# Patient Record
Sex: FEMALE | Race: OTHER | NOT HISPANIC OR LATINO | URBAN - METROPOLITAN AREA
[De-identification: names, ages, dates, MRNs, and addresses within clinical notes are randomized per-mention and may not be internally consistent; named-entity substitution may affect disease eponyms.]

---

## 2017-01-14 ENCOUNTER — EMERGENCY (EMERGENCY)
Facility: HOSPITAL | Age: 47
LOS: 1 days | Discharge: HOME | End: 2017-01-14
Attending: EMERGENCY MEDICINE | Admitting: EMERGENCY MEDICINE
Payer: COMMERCIAL

## 2017-01-14 VITALS
OXYGEN SATURATION: 100 % | TEMPERATURE: 98 F | SYSTOLIC BLOOD PRESSURE: 156 MMHG | HEART RATE: 89 BPM | DIASTOLIC BLOOD PRESSURE: 96 MMHG | RESPIRATION RATE: 18 BRPM

## 2017-01-14 DIAGNOSIS — N83.201 UNSPECIFIED OVARIAN CYST, RIGHT SIDE: Chronic | ICD-10-CM

## 2017-01-14 LAB
ALBUMIN SERPL ELPH-MCNC: 4.3 G/DL — SIGNIFICANT CHANGE UP (ref 3.3–5)
ALP SERPL-CCNC: 58 U/L — SIGNIFICANT CHANGE UP (ref 40–120)
ALT FLD-CCNC: 21 U/L — SIGNIFICANT CHANGE UP (ref 4–33)
APPEARANCE UR: SIGNIFICANT CHANGE UP
AST SERPL-CCNC: 23 U/L — SIGNIFICANT CHANGE UP (ref 4–32)
BASOPHILS # BLD AUTO: 0.03 K/UL — SIGNIFICANT CHANGE UP (ref 0–0.2)
BASOPHILS NFR BLD AUTO: 0.3 % — SIGNIFICANT CHANGE UP (ref 0–2)
BILIRUB SERPL-MCNC: 0.3 MG/DL — SIGNIFICANT CHANGE UP (ref 0.2–1.2)
BILIRUB UR-MCNC: NEGATIVE — SIGNIFICANT CHANGE UP
BLOOD UR QL VISUAL: NEGATIVE — SIGNIFICANT CHANGE UP
BUN SERPL-MCNC: 13 MG/DL — SIGNIFICANT CHANGE UP (ref 7–23)
CALCIUM SERPL-MCNC: 9.9 MG/DL — SIGNIFICANT CHANGE UP (ref 8.4–10.5)
CHLORIDE SERPL-SCNC: 103 MMOL/L — SIGNIFICANT CHANGE UP (ref 98–107)
CK MB BLD-MCNC: 1.81 NG/ML — SIGNIFICANT CHANGE UP (ref 1–4.7)
CK MB BLD-MCNC: SIGNIFICANT CHANGE UP (ref 0–2.5)
CK SERPL-CCNC: 123 U/L — SIGNIFICANT CHANGE UP (ref 25–170)
CK SERPL-CCNC: 139 U/L — SIGNIFICANT CHANGE UP (ref 25–170)
CO2 SERPL-SCNC: 24 MMOL/L — SIGNIFICANT CHANGE UP (ref 22–31)
COLOR SPEC: SIGNIFICANT CHANGE UP
CREAT SERPL-MCNC: 0.8 MG/DL — SIGNIFICANT CHANGE UP (ref 0.5–1.3)
EOSINOPHIL # BLD AUTO: 0.2 K/UL — SIGNIFICANT CHANGE UP (ref 0–0.5)
EOSINOPHIL NFR BLD AUTO: 2.1 % — SIGNIFICANT CHANGE UP (ref 0–6)
GLUCOSE SERPL-MCNC: 178 MG/DL — HIGH (ref 70–99)
GLUCOSE UR-MCNC: 150 — SIGNIFICANT CHANGE UP
HBA1C BLD-MCNC: 7.2 % — HIGH (ref 4–5.6)
HCT VFR BLD CALC: 40.2 % — SIGNIFICANT CHANGE UP (ref 34.5–45)
HGB BLD-MCNC: 13.2 G/DL — SIGNIFICANT CHANGE UP (ref 11.5–15.5)
IMM GRANULOCYTES NFR BLD AUTO: 0.3 % — SIGNIFICANT CHANGE UP (ref 0–1.5)
KETONES UR-MCNC: NEGATIVE — SIGNIFICANT CHANGE UP
LEUKOCYTE ESTERASE UR-ACNC: NEGATIVE — SIGNIFICANT CHANGE UP
LYMPHOCYTES # BLD AUTO: 2.71 K/UL — SIGNIFICANT CHANGE UP (ref 1–3.3)
LYMPHOCYTES # BLD AUTO: 28.8 % — SIGNIFICANT CHANGE UP (ref 13–44)
MCHC RBC-ENTMCNC: 28.3 PG — SIGNIFICANT CHANGE UP (ref 27–34)
MCHC RBC-ENTMCNC: 32.8 % — SIGNIFICANT CHANGE UP (ref 32–36)
MCV RBC AUTO: 86.1 FL — SIGNIFICANT CHANGE UP (ref 80–100)
MONOCYTES # BLD AUTO: 0.5 K/UL — SIGNIFICANT CHANGE UP (ref 0–0.9)
MONOCYTES NFR BLD AUTO: 5.3 % — SIGNIFICANT CHANGE UP (ref 2–14)
NEUTROPHILS # BLD AUTO: 5.94 K/UL — SIGNIFICANT CHANGE UP (ref 1.8–7.4)
NEUTROPHILS NFR BLD AUTO: 63.2 % — SIGNIFICANT CHANGE UP (ref 43–77)
NITRITE UR-MCNC: NEGATIVE — SIGNIFICANT CHANGE UP
PH UR: 7.5 — SIGNIFICANT CHANGE UP (ref 4.6–8)
PLATELET # BLD AUTO: 323 K/UL — SIGNIFICANT CHANGE UP (ref 150–400)
PMV BLD: 8.9 FL — SIGNIFICANT CHANGE UP (ref 7–13)
POTASSIUM SERPL-MCNC: 3.7 MMOL/L — SIGNIFICANT CHANGE UP (ref 3.5–5.3)
POTASSIUM SERPL-SCNC: 3.7 MMOL/L — SIGNIFICANT CHANGE UP (ref 3.5–5.3)
PROT SERPL-MCNC: 7.5 G/DL — SIGNIFICANT CHANGE UP (ref 6–8.3)
PROT UR-MCNC: NEGATIVE — SIGNIFICANT CHANGE UP
RBC # BLD: 4.67 M/UL — SIGNIFICANT CHANGE UP (ref 3.8–5.2)
RBC # FLD: 13 % — SIGNIFICANT CHANGE UP (ref 10.3–14.5)
SODIUM SERPL-SCNC: 142 MMOL/L — SIGNIFICANT CHANGE UP (ref 135–145)
SP GR SPEC: 1.01 — SIGNIFICANT CHANGE UP (ref 1–1.03)
TROPONIN T SERPL-MCNC: < 0.06 NG/ML — SIGNIFICANT CHANGE UP (ref 0–0.06)
TROPONIN T SERPL-MCNC: < 0.06 NG/ML — SIGNIFICANT CHANGE UP (ref 0–0.06)
UROBILINOGEN FLD QL: NORMAL E.U. — SIGNIFICANT CHANGE UP (ref 0.1–0.2)
WBC # BLD: 9.41 K/UL — SIGNIFICANT CHANGE UP (ref 3.8–10.5)
WBC # FLD AUTO: 9.41 K/UL — SIGNIFICANT CHANGE UP (ref 3.8–10.5)

## 2017-01-14 PROCEDURE — 93010 ELECTROCARDIOGRAM REPORT: CPT | Mod: 59

## 2017-01-14 PROCEDURE — 71020: CPT | Mod: 26

## 2017-01-14 PROCEDURE — 93010 ELECTROCARDIOGRAM REPORT: CPT | Mod: 77

## 2017-01-14 PROCEDURE — 99220: CPT | Mod: 25

## 2017-01-14 RX ORDER — LOSARTAN POTASSIUM 100 MG/1
1 TABLET, FILM COATED ORAL
Qty: 0 | Refills: 0 | COMMUNITY

## 2017-01-14 RX ORDER — FENOFIBRATE,MICRONIZED 130 MG
0 CAPSULE ORAL
Qty: 0 | Refills: 0 | COMMUNITY

## 2017-01-14 RX ORDER — CLOPIDOGREL BISULFATE 75 MG/1
75 TABLET, FILM COATED ORAL DAILY
Qty: 0 | Refills: 0 | Status: DISCONTINUED | OUTPATIENT
Start: 2017-01-14 | End: 2017-01-18

## 2017-01-14 RX ORDER — AMLODIPINE BESYLATE 2.5 MG/1
1 TABLET ORAL
Qty: 0 | Refills: 0 | COMMUNITY

## 2017-01-14 RX ORDER — FENOFIBRATE,MICRONIZED 130 MG
145 CAPSULE ORAL DAILY
Qty: 0 | Refills: 0 | Status: DISCONTINUED | OUTPATIENT
Start: 2017-01-14 | End: 2017-01-18

## 2017-01-14 RX ORDER — AMLODIPINE BESYLATE 2.5 MG/1
5 TABLET ORAL DAILY
Qty: 0 | Refills: 0 | Status: DISCONTINUED | OUTPATIENT
Start: 2017-01-14 | End: 2017-01-18

## 2017-01-14 RX ORDER — LOSARTAN POTASSIUM 100 MG/1
100 TABLET, FILM COATED ORAL DAILY
Qty: 0 | Refills: 0 | Status: DISCONTINUED | OUTPATIENT
Start: 2017-01-14 | End: 2017-01-18

## 2017-01-14 RX ADMIN — CLOPIDOGREL BISULFATE 75 MILLIGRAM(S): 75 TABLET, FILM COATED ORAL at 12:50

## 2017-01-14 NOTE — ED SUB INTERN NOTE - MEDICAL DECISION MAKING DETAILS
Obtain vitals q4hrs. Telemetry monitoring, repeat cardiac enzymes and Stress test eval for tomorrow morning.

## 2017-01-14 NOTE — ED PROVIDER NOTE - OBJECTIVE STATEMENT
45yo female pmh HTN, HLD, GERD p/w left parasternal sharp chest pain with radiation with the left pectoral region, 45yo female pmh HTN, HLD, GERD p/w left parasternal sharp chest pain with radiation with the left pectoral region. Started while sitting in chair, no change with exertion. Lasts 15 min. No hx of chest pain. denies lightheadedness, dyspnea, cough, abd pain, n/v/d, LE swelling. Fam hx signifcant for MI and CAD in maternal grandmother, grandfather, multiple aunts, uncle

## 2017-01-14 NOTE — ED SUB INTERN NOTE - FAMILY HISTORY
Father  Still living? Unknown  Family history of hypertension, Age at diagnosis: 61-70     Mother  Still living? Yes, Estimated age: 61-70  Family history of hypertension, Age at diagnosis: 61-70 Father  Still living? Unknown  Family history of hypertension, Age at diagnosis: 61-70     Mother  Still living? Yes, Estimated age: 61-70  Family history of hypertension, Age at diagnosis: 61-70     Aunt  Still living? No  Family history of myocardial infarction at age less than 60, Age at diagnosis: 21-30  Family history of valvular heart disease, Age at diagnosis: Age Unknown     Uncle  Still living? No  Family history of valvular heart disease, Age at diagnosis: Age Unknown

## 2017-01-14 NOTE — ED PROVIDER NOTE - PROGRESS NOTE DETAILS
Called Dr. Zhang's office. Ember from Dr. Zhang's office stated the following:  Stress test 2/19/16: No evidence of myocardial ischemia or infarction  Echo 3/4/16 EF 60-65%, diminished LV compliance.    CXR: 8/11/16: small focal area of scarring in inferior lingular segment of MARTA

## 2017-01-14 NOTE — ED PROVIDER NOTE - ATTENDING CONTRIBUTION TO CARE
47 y/o F with h/o HTN, hyperlipidemia here with L sided chest pain since yesterday morning.  Pt reports intermittent episodes of nonradiating left sided chest pain, not associated with any exertion or meals.  Episodes of pain lasting 10-15 min, resolving on own.  No associated fever, chills, sob, palpitations, abd pain, n/v, leg pain or swelling.  Pt also reports 2 months of intermittent cough productive of clear mucous.  No known sick contacts or hospitalizations.  (+)travel to [place] earlier this week.  Pt has strong h/o cardiac disease in her family (CAD in 40s and 50s in maternal aunts and uncles), but no personal h/o cardiac disease.  She reports a normal outpatient stress test 1 year ago.  Well appearing, lying comfortably in stretcher, awake and alert, nontoxic.  VSS.  Lungs cta bl.  Cards nl S1/S2, RRR, no MRG.  Abd soft ntnd.  No pedal edema or calf tenderness.  Plan for labs and cxr, including cardiac enzymes.  EKG with TW flattening/inversions in lateral leads with no ST elevations or depressions (no previous available for comparison).  In setting of personal and familial risk factors and EKG abnormalities, pt will require obs vs admission for tele monitoring, serial enzymes, and provocative testing.

## 2017-01-14 NOTE — ED CDU PROVIDER NOTE - PROGRESS NOTE DETAILS
CDU RONNIE Deleon Addendum------  This patient was signed out to me by CDU RONNIE Parisi and CDU attending Dr. Bloch on 01/14/17 at 1900 hrs; test results reviewed.  In interim, pt has been resting comfortably; no complaints in interim.  CE x 2 negative.  Pt stable at present; will continue to monitor / reassess. CDU PA Pancho Addendum----  Pt. resting comfortably in interim; no issues to date; will continue to monitor / reassess.  Pt. will be signed out to CDU day PA and attending at 0700 hrs. MD Magdaleno CDU Note: Pt states feeling better. Pt notes having intermittent chest discomfort (Allergic to ASA). Pt denies any nausea, vomiting, SOB, or abd pain. Lungs CTA b/l. RRR. Abd soft, non-tender. CE neg X 2. Awaiting Stress. Will continue to monitor. CDU MD Magdaleno CDU Attestation for 1/15/17: I have evaluated the patient and agree with the documentation and assessment as made by the PA. We have discussed plan of care and work up for the patient. RONNIE Caballero- Pt feeling better after ER stay. Stress test neg for acute pathology. Pt to be dc with pmd/cards f/u. stable for dc CDU MD Magdaleno D/C Note: Pt states feeling better. Denies any current chest pain. CE neg X 2. Normal stress. Will D/C home with Cardio/PMD f/u.

## 2017-01-14 NOTE — ED ADULT TRIAGE NOTE - CHIEF COMPLAINT QUOTE
c/o intermittent sharp, non radiating left sided chest pain since yesterday.  ekg done at triage.  denies cardiac history.  pt states she has had a non productive cough for about 2 months  states she has htn takes meds for same.  in nad at triage

## 2017-01-14 NOTE — ED SUB INTERN NOTE - OBJECTIVE STATEMENT FT
45 y/o female with PMH of HTN and HLD reports to the ER for chest pain x 2 days. Patient states the pain started yesterday morning around 9am while she was at work. She was sitting down when the pain initially started and had not done any strenuous work prior. She describes the quality as "sharp and stabbing" and says that she feels a "sharp biting sensation" that is a 10/10 severity on the pain scale. The pain is intermittent and comes on approximately every 10 minutes and lasts for about 15 mins at a time and then just goes away on it own. She denies self treatment and states nothing makes the pain better or worse. She says this morning when she woke up at 7:30 am the pain returned again so she decided to come into the ER. Denies chest tightness, palpitations, heart murmurs, edema, SOB, dyspnea on exertion, orthopnea, paroxysmal nocturnal dyspnea, pain with respiraton, wheezing, cyanosis, edema, dizziness, lightheadedness, loss of consciousness, fever, chills, nausea, vomiting diarrhea, abdominal pain.

## 2017-01-14 NOTE — ED CDU PROVIDER NOTE - FAMILY HISTORY
Father  Still living? Unknown  Family history of hypertension, Age at diagnosis: 61-70     Mother  Still living? Yes, Estimated age: 61-70  Family history of hypertension, Age at diagnosis: 61-70     Aunt  Still living? No  Family history of myocardial infarction at age less than 60, Age at diagnosis: 21-30  Family history of valvular heart disease, Age at diagnosis: Age Unknown     Uncle  Still living? No  Family history of valvular heart disease, Age at diagnosis: Age Unknown

## 2017-01-14 NOTE — ED CDU PROVIDER NOTE - ATTENDING CONTRIBUTION TO CARE
Dr Bloch- patient with sharp CP , no n/v/sob, nonradiating,multiple risk factors. Well apparing NAd, HEENT nml, lungs clear, heart sounds s1s2 no  mgr, abd soft nontender , tenderness on palpating sternum. ext no edema, neuro nonfocal- concern for ACS

## 2017-01-14 NOTE — ED ADULT NURSE REASSESSMENT NOTE - NS ED NURSE REASSESS COMMENT FT1
Pt resting stable, cardiac monitoring ongoing.  Denies chest pain, sob, other distress at the moment.

## 2017-01-14 NOTE — ED PROVIDER NOTE - MEDICAL DECISION MAKING DETAILS
46F HTN, HLD p/w left parasternal chest pain. +family history for cardiac disease, TWI in EKG and risk factors HEART score 3 with normal stress and echo 1 year prior. Deserves further cardiac evaluation with stress and echo. Can observe in CDU. Dr. Bloch aware. Cardiac enzymes, cbc, cmp, allergic to aspirin will give plavix 75

## 2017-01-14 NOTE — ED CDU PROVIDER NOTE - OBJECTIVE STATEMENT
47yo female pmh HTN, HLD, GERD p/w left parasternal sharp chest pain with radiation with the left pectoral region. Started while sitting in chair, no change with exertion. Lasts 15 min. No hx of chest pain. denies lightheadedness, dyspnea, cough, abd pain, n/v/d, LE swelling. Fam hx signifcant for MI and CAD in maternal grandmother, grandfather, multiple aunts, uncle.    CDU PA: Agree with above, patient 45 yo [place] female with intermittent episodes of chest pain since yesterday.  Pt has strong family hx of cardiac disease as well as TWI in lateral leads. Pt states had a negative stress test one year ago but does not recall the cardiologist she saw then. Sent to CDU for tele monitoring, stress test in AM. Currently patient has no chest pain.

## 2017-01-15 VITALS
SYSTOLIC BLOOD PRESSURE: 120 MMHG | RESPIRATION RATE: 14 BRPM | OXYGEN SATURATION: 100 % | TEMPERATURE: 97 F | HEART RATE: 75 BPM | DIASTOLIC BLOOD PRESSURE: 75 MMHG

## 2017-01-15 PROCEDURE — 99217: CPT

## 2017-01-15 PROCEDURE — 93018 CV STRESS TEST I&R ONLY: CPT | Mod: GC

## 2017-01-15 PROCEDURE — 93016 CV STRESS TEST SUPVJ ONLY: CPT | Mod: GC

## 2017-01-15 PROCEDURE — 78452 HT MUSCLE IMAGE SPECT MULT: CPT | Mod: 26

## 2017-01-15 RX ORDER — ACETAMINOPHEN 500 MG
650 TABLET ORAL ONCE
Qty: 0 | Refills: 0 | Status: COMPLETED | OUTPATIENT
Start: 2017-01-15 | End: 2017-01-15

## 2017-01-15 RX ORDER — METFORMIN HYDROCHLORIDE 850 MG/1
1 TABLET ORAL
Qty: 60 | Refills: 0 | OUTPATIENT
Start: 2017-01-15 | End: 2017-02-14

## 2017-01-15 RX ADMIN — Medication 145 MILLIGRAM(S): at 11:14

## 2017-01-15 RX ADMIN — CLOPIDOGREL BISULFATE 75 MILLIGRAM(S): 75 TABLET, FILM COATED ORAL at 11:14

## 2017-01-15 RX ADMIN — AMLODIPINE BESYLATE 5 MILLIGRAM(S): 2.5 TABLET ORAL at 06:52

## 2017-01-15 RX ADMIN — LOSARTAN POTASSIUM 100 MILLIGRAM(S): 100 TABLET, FILM COATED ORAL at 06:52

## 2018-09-12 NOTE — ED ADULT NURSE NOTE - ED CARDIAC RHYTHM
[Ill-Appearing] : ill-appearing [Normal Sclera/Conjunctiva] : normal sclera/conjunctiva [Normal Outer Ear/Nose] : the outer ears and nose were normal in appearance regular [No JVD] : no jugular venous distention [No Respiratory Distress] : no respiratory distress  [Normal Rate] : normal rate  [Soft] : abdomen soft [Non Tender] : non-tender [Non-distended] : non-distended [No Masses] : no abdominal mass palpated [Normal Bowel Sounds] : normal bowel sounds [Normal Sphincter Tone] : normal sphincter tone [Stool Occult Blood] : stool positive for occult blood [No Rash] : no rash [Normal Gait] : normal gait

## 2019-03-21 ENCOUNTER — EMERGENCY (EMERGENCY)
Facility: HOSPITAL | Age: 49
LOS: 1 days | Discharge: HOME | End: 2019-03-21
Attending: EMERGENCY MEDICINE | Admitting: EMERGENCY MEDICINE
Payer: COMMERCIAL

## 2019-03-21 VITALS
RESPIRATION RATE: 18 BRPM | SYSTOLIC BLOOD PRESSURE: 223 MMHG | TEMPERATURE: 98 F | OXYGEN SATURATION: 100 % | DIASTOLIC BLOOD PRESSURE: 103 MMHG | HEART RATE: 60 BPM

## 2019-03-21 DIAGNOSIS — N83.201 UNSPECIFIED OVARIAN CYST, RIGHT SIDE: Chronic | ICD-10-CM

## 2019-03-21 PROCEDURE — 99284 EMERGENCY DEPT VISIT MOD MDM: CPT

## 2019-03-21 NOTE — ED PROVIDER NOTE - CLINICAL SUMMARY MEDICAL DECISION MAKING FREE TEXT BOX
Asymptomatic htn, uncontrolled at home on metoprolol 100 daily, here 216 systolic, denies any sx at this time but concerned with the elevation. Check EKG, basic labs, control bp here, reach out to pcp in regards to medication management.   Shanell Baptiste, PGY-2 EM

## 2019-03-21 NOTE — ED ADULT TRIAGE NOTE - CHIEF COMPLAINT QUOTE
Patient c/o HTN at home today (202/119) around 8:15pm. Patient reports she takes 100mg metoprolol daily and is compliant; last dose 8AM today. Patient c/o left chest pain and numbness to tongue and right hand starting 6pm.

## 2019-03-21 NOTE — ED ADULT NURSE NOTE - OBJECTIVE STATEMENT
Pt received to room 16 a/o x 3 c/o hypertension x 1 month, intermittent sharp left sided chest pain and tongue numbness x2 weeks. Pt states her doctor switched her from losartan to metoprolol a month ago and has been compliant with her medication. Pt states the pressure continued to be high. Pt  denies any chest pain at this time. Pt denies any blurred vision, head ache. Respirations even and unlabored. Lung sounds clear with equal chest rise bilaterally. ABD is soft, non tender, non distended with normal active bowel sounds No complaints of chest pain, headache, nausea, dizziness, vomiting  SOB, fever, chills verbalized.

## 2019-03-21 NOTE — ED PROVIDER NOTE - NS ED ROS FT
General: No fevers / chills  HENT: No head trauma, ear pain, runny nose, or sore throat  Eyes: No visual changes  CP: No chest pain, palpitations, or light headedness  Resp: No shortness of breath, no cough  GI: No abdominal pain, diarrhea, constipation, nausea, or vomiting  : No urinary fz, dysuria, or hematuria  Neuro: No numbness, tingling, or weakness General: No fevers / chills  HENT: No head trauma, ear pain, runny nose, or sore throat  Eyes: No visual changes  CP: No chest pain, palpitations, or light headedness  Resp: No shortness of breath, no cough  GI: No abdominal pain, diarrhea, constipation, nausea, or vomiting  : No urinary fz, dysuria, or hematuria  Neuro: No numbness, tingling, or weakness    Attending/Moncho: Well-appearing, NAD; PERRL/EOMI, non-icterus, no carotid bruit, supple, no CRYSTAL, no JVD, RRR, CTAB; Abd-soft, NT/ND, no HSM; no LE edema, A&Ox3, nonfocal; Skin-warm/dry

## 2019-03-21 NOTE — ED PROVIDER NOTE - NSFOLLOWUPINSTRUCTIONS_ED_ALL_ED_FT
1) Please follow-up with your primary care doctor in the next 1-2 days.  Please call tomorrow for an appointment.  If you cannot follow-up with your primary care doctor please return to the ED for any urgent issues.  2) You were given a copy of the tests performed today.  Please bring the results with you and review them with your primary care doctor.  3) If you have any worsening of symptoms or any other concerns please return to the ED immediately. This includes chest pain, shortness of breath, fever/chills, increased pain, or any other concerns.  4) Please continue taking your home medications as directed.    **New medications: amlodipine 5mg 1x daily

## 2019-03-21 NOTE — ED PROVIDER NOTE - OBJECTIVE STATEMENT
50 yo F hx of htn, hld, taking metoprolol 100 daily presents with elevated bp. Pt reports she recently had her medication changed from losartan to metoprolol 1 month ago. Has had trouble controlling her blood pressure recently. Pt has had no cardiac interventions in the past. Denies any chest pain or sob at this time. No neurologic signs including headache, change in vision, weakness/numbness/tingling. Reports BP at home to be in the 190s systolic. 50 yo F hx of htn, hld, taking metoprolol 100 daily presents with elevated bp. Pt reports she recently had her medication changed from losartan to metoprolol 1 month ago. Has had trouble controlling her blood pressure recently. Pt has had no cardiac interventions in the past. Denies any chest pain or sob at this time. No neurologic signs including headache, change in vision, weakness/numbness/tingling. Reports BP at home to be in the 190s systolic.    Attending/Moncho: 50 yo F as noted above, noted BP ~6pm to be elevated . Pt also noted non-exertional mild left-sided chest pain described as mild pin-prick, transient. Denies SOB, palp, weakness, n/v or change in ET. Pt had recent cardiac stress that was normal. Currently on Metorpolol 100mg once a day.  PMD: Dr. Curry [phone]

## 2019-03-22 VITALS
SYSTOLIC BLOOD PRESSURE: 187 MMHG | HEART RATE: 57 BPM | OXYGEN SATURATION: 97 % | DIASTOLIC BLOOD PRESSURE: 118 MMHG | RESPIRATION RATE: 18 BRPM | TEMPERATURE: 98 F

## 2019-03-22 PROBLEM — E78.5 HYPERLIPIDEMIA, UNSPECIFIED: Chronic | Status: ACTIVE | Noted: 2017-01-14

## 2019-03-22 PROBLEM — I10 ESSENTIAL (PRIMARY) HYPERTENSION: Chronic | Status: ACTIVE | Noted: 2017-01-14

## 2019-03-22 LAB
ALBUMIN SERPL ELPH-MCNC: 4.5 G/DL — SIGNIFICANT CHANGE UP (ref 3.3–5)
ALP SERPL-CCNC: 55 U/L — SIGNIFICANT CHANGE UP (ref 40–120)
ALT FLD-CCNC: 18 U/L — SIGNIFICANT CHANGE UP (ref 4–33)
ANION GAP SERPL CALC-SCNC: 14 MMO/L — SIGNIFICANT CHANGE UP (ref 7–14)
AST SERPL-CCNC: 20 U/L — SIGNIFICANT CHANGE UP (ref 4–32)
BASOPHILS # BLD AUTO: 0.05 K/UL — SIGNIFICANT CHANGE UP (ref 0–0.2)
BASOPHILS NFR BLD AUTO: 0.7 % — SIGNIFICANT CHANGE UP (ref 0–2)
BILIRUB SERPL-MCNC: 0.2 MG/DL — SIGNIFICANT CHANGE UP (ref 0.2–1.2)
BUN SERPL-MCNC: 12 MG/DL — SIGNIFICANT CHANGE UP (ref 7–23)
CALCIUM SERPL-MCNC: 10 MG/DL — SIGNIFICANT CHANGE UP (ref 8.4–10.5)
CHLORIDE SERPL-SCNC: 104 MMOL/L — SIGNIFICANT CHANGE UP (ref 98–107)
CO2 SERPL-SCNC: 24 MMOL/L — SIGNIFICANT CHANGE UP (ref 22–31)
CREAT SERPL-MCNC: 0.71 MG/DL — SIGNIFICANT CHANGE UP (ref 0.5–1.3)
EOSINOPHIL # BLD AUTO: 0.25 K/UL — SIGNIFICANT CHANGE UP (ref 0–0.5)
EOSINOPHIL NFR BLD AUTO: 3.6 % — SIGNIFICANT CHANGE UP (ref 0–6)
GLUCOSE SERPL-MCNC: 132 MG/DL — HIGH (ref 70–99)
HCT VFR BLD CALC: 42.6 % — SIGNIFICANT CHANGE UP (ref 34.5–45)
HGB BLD-MCNC: 13.5 G/DL — SIGNIFICANT CHANGE UP (ref 11.5–15.5)
IMM GRANULOCYTES NFR BLD AUTO: 0.1 % — SIGNIFICANT CHANGE UP (ref 0–1.5)
LYMPHOCYTES # BLD AUTO: 3.61 K/UL — HIGH (ref 1–3.3)
LYMPHOCYTES # BLD AUTO: 52.1 % — HIGH (ref 13–44)
MCHC RBC-ENTMCNC: 28.6 PG — SIGNIFICANT CHANGE UP (ref 27–34)
MCHC RBC-ENTMCNC: 31.7 % — LOW (ref 32–36)
MCV RBC AUTO: 90.3 FL — SIGNIFICANT CHANGE UP (ref 80–100)
MONOCYTES # BLD AUTO: 0.46 K/UL — SIGNIFICANT CHANGE UP (ref 0–0.9)
MONOCYTES NFR BLD AUTO: 6.6 % — SIGNIFICANT CHANGE UP (ref 2–14)
NEUTROPHILS # BLD AUTO: 2.55 K/UL — SIGNIFICANT CHANGE UP (ref 1.8–7.4)
NEUTROPHILS NFR BLD AUTO: 36.9 % — LOW (ref 43–77)
NRBC # FLD: 0 K/UL — LOW (ref 25–125)
PLATELET # BLD AUTO: 301 K/UL — SIGNIFICANT CHANGE UP (ref 150–400)
PMV BLD: 9.3 FL — SIGNIFICANT CHANGE UP (ref 7–13)
POTASSIUM SERPL-MCNC: 4.8 MMOL/L — SIGNIFICANT CHANGE UP (ref 3.5–5.3)
POTASSIUM SERPL-SCNC: 4.8 MMOL/L — SIGNIFICANT CHANGE UP (ref 3.5–5.3)
PROT SERPL-MCNC: 7.3 G/DL — SIGNIFICANT CHANGE UP (ref 6–8.3)
RBC # BLD: 4.72 M/UL — SIGNIFICANT CHANGE UP (ref 3.8–5.2)
RBC # FLD: 12.6 % — SIGNIFICANT CHANGE UP (ref 10.3–14.5)
SODIUM SERPL-SCNC: 142 MMOL/L — SIGNIFICANT CHANGE UP (ref 135–145)
TROPONIN T, HIGH SENSITIVITY: 6 NG/L — SIGNIFICANT CHANGE UP (ref ?–14)
TSH SERPL-MCNC: 3.99 UIU/ML — SIGNIFICANT CHANGE UP (ref 0.27–4.2)
WBC # BLD: 6.93 K/UL — SIGNIFICANT CHANGE UP (ref 3.8–10.5)
WBC # FLD AUTO: 6.93 K/UL — SIGNIFICANT CHANGE UP (ref 3.8–10.5)

## 2019-03-22 RX ORDER — AMLODIPINE BESYLATE 2.5 MG/1
5 TABLET ORAL ONCE
Qty: 0 | Refills: 0 | Status: COMPLETED | OUTPATIENT
Start: 2019-03-22 | End: 2019-03-22

## 2019-03-22 RX ORDER — AMLODIPINE BESYLATE 2.5 MG/1
1 TABLET ORAL
Qty: 7 | Refills: 0 | OUTPATIENT
Start: 2019-03-22 | End: 2019-03-28

## 2019-03-22 RX ADMIN — AMLODIPINE BESYLATE 5 MILLIGRAM(S): 2.5 TABLET ORAL at 01:25

## 2019-03-30 ENCOUNTER — EMERGENCY (EMERGENCY)
Facility: HOSPITAL | Age: 49
LOS: 1 days | Discharge: HOME | End: 2019-03-30
Attending: EMERGENCY MEDICINE | Admitting: EMERGENCY MEDICINE
Payer: COMMERCIAL

## 2019-03-30 VITALS
OXYGEN SATURATION: 100 % | TEMPERATURE: 98 F | SYSTOLIC BLOOD PRESSURE: 152 MMHG | HEART RATE: 61 BPM | DIASTOLIC BLOOD PRESSURE: 100 MMHG | RESPIRATION RATE: 18 BRPM

## 2019-03-30 DIAGNOSIS — N83.201 UNSPECIFIED OVARIAN CYST, RIGHT SIDE: Chronic | ICD-10-CM

## 2019-03-30 LAB
ALBUMIN SERPL ELPH-MCNC: 4.9 G/DL — SIGNIFICANT CHANGE UP (ref 3.3–5)
ALP SERPL-CCNC: 63 U/L — SIGNIFICANT CHANGE UP (ref 40–120)
ALT FLD-CCNC: 23 U/L — SIGNIFICANT CHANGE UP (ref 4–33)
ANION GAP SERPL CALC-SCNC: 15 MMO/L — HIGH (ref 7–14)
AST SERPL-CCNC: 28 U/L — SIGNIFICANT CHANGE UP (ref 4–32)
BASE EXCESS BLDV CALC-SCNC: 1.2 MMOL/L — SIGNIFICANT CHANGE UP
BASE EXCESS BLDV CALC-SCNC: 1.3 MMOL/L — SIGNIFICANT CHANGE UP
BASOPHILS # BLD AUTO: 0.03 K/UL — SIGNIFICANT CHANGE UP (ref 0–0.2)
BASOPHILS NFR BLD AUTO: 0.4 % — SIGNIFICANT CHANGE UP (ref 0–2)
BILIRUB SERPL-MCNC: 0.3 MG/DL — SIGNIFICANT CHANGE UP (ref 0.2–1.2)
BLOOD GAS VENOUS - CREATININE: 0.42 MG/DL — LOW (ref 0.5–1.3)
BLOOD GAS VENOUS - CREATININE: 0.46 MG/DL — LOW (ref 0.5–1.3)
BUN SERPL-MCNC: 10 MG/DL — SIGNIFICANT CHANGE UP (ref 7–23)
CALCIUM SERPL-MCNC: 10.1 MG/DL — SIGNIFICANT CHANGE UP (ref 8.4–10.5)
CHLORIDE BLDV-SCNC: 104 MMOL/L — SIGNIFICANT CHANGE UP (ref 96–108)
CHLORIDE BLDV-SCNC: 106 MMOL/L — SIGNIFICANT CHANGE UP (ref 96–108)
CHLORIDE SERPL-SCNC: 102 MMOL/L — SIGNIFICANT CHANGE UP (ref 98–107)
CO2 SERPL-SCNC: 22 MMOL/L — SIGNIFICANT CHANGE UP (ref 22–31)
CREAT SERPL-MCNC: 0.61 MG/DL — SIGNIFICANT CHANGE UP (ref 0.5–1.3)
EOSINOPHIL # BLD AUTO: 0.14 K/UL — SIGNIFICANT CHANGE UP (ref 0–0.5)
EOSINOPHIL NFR BLD AUTO: 2 % — SIGNIFICANT CHANGE UP (ref 0–6)
GAS PNL BLDV: 141 MMOL/L — SIGNIFICANT CHANGE UP (ref 136–146)
GAS PNL BLDV: 141 MMOL/L — SIGNIFICANT CHANGE UP (ref 136–146)
GLUCOSE BLDV-MCNC: 120 — HIGH (ref 70–99)
GLUCOSE BLDV-MCNC: 95 — SIGNIFICANT CHANGE UP (ref 70–99)
GLUCOSE SERPL-MCNC: 115 MG/DL — HIGH (ref 70–99)
HCO3 BLDV-SCNC: 24 MMOL/L — SIGNIFICANT CHANGE UP (ref 20–27)
HCO3 BLDV-SCNC: 25 MMOL/L — SIGNIFICANT CHANGE UP (ref 20–27)
HCT VFR BLD CALC: 43.4 % — SIGNIFICANT CHANGE UP (ref 34.5–45)
HCT VFR BLDV CALC: 43 % — SIGNIFICANT CHANGE UP (ref 34.5–45)
HCT VFR BLDV CALC: 45.1 % — HIGH (ref 34.5–45)
HGB BLD-MCNC: 14.4 G/DL — SIGNIFICANT CHANGE UP (ref 11.5–15.5)
HGB BLDV-MCNC: 14 G/DL — SIGNIFICANT CHANGE UP (ref 11.5–15.5)
HGB BLDV-MCNC: 14.7 G/DL — SIGNIFICANT CHANGE UP (ref 11.5–15.5)
IMM GRANULOCYTES NFR BLD AUTO: 0.4 % — SIGNIFICANT CHANGE UP (ref 0–1.5)
LACTATE BLDV-MCNC: 1.5 MMOL/L — SIGNIFICANT CHANGE UP (ref 0.5–2)
LACTATE BLDV-MCNC: 2.5 MMOL/L — HIGH (ref 0.5–2)
LYMPHOCYTES # BLD AUTO: 2.55 K/UL — SIGNIFICANT CHANGE UP (ref 1–3.3)
LYMPHOCYTES # BLD AUTO: 35.7 % — SIGNIFICANT CHANGE UP (ref 13–44)
MCHC RBC-ENTMCNC: 29.2 PG — SIGNIFICANT CHANGE UP (ref 27–34)
MCHC RBC-ENTMCNC: 33.2 % — SIGNIFICANT CHANGE UP (ref 32–36)
MCV RBC AUTO: 88 FL — SIGNIFICANT CHANGE UP (ref 80–100)
MONOCYTES # BLD AUTO: 0.38 K/UL — SIGNIFICANT CHANGE UP (ref 0–0.9)
MONOCYTES NFR BLD AUTO: 5.3 % — SIGNIFICANT CHANGE UP (ref 2–14)
NEUTROPHILS # BLD AUTO: 4.01 K/UL — SIGNIFICANT CHANGE UP (ref 1.8–7.4)
NEUTROPHILS NFR BLD AUTO: 56.2 % — SIGNIFICANT CHANGE UP (ref 43–77)
NRBC # FLD: 0 K/UL — SIGNIFICANT CHANGE UP (ref 0–0)
PCO2 BLDV: 43 MMHG — SIGNIFICANT CHANGE UP (ref 41–51)
PCO2 BLDV: 48 MMHG — SIGNIFICANT CHANGE UP (ref 41–51)
PH BLDV: 7.35 PH — SIGNIFICANT CHANGE UP (ref 7.32–7.43)
PH BLDV: 7.39 PH — SIGNIFICANT CHANGE UP (ref 7.32–7.43)
PLATELET # BLD AUTO: 333 K/UL — SIGNIFICANT CHANGE UP (ref 150–400)
PMV BLD: 9.1 FL — SIGNIFICANT CHANGE UP (ref 7–13)
PO2 BLDV: 31 MMHG — LOW (ref 35–40)
PO2 BLDV: 37 MMHG — SIGNIFICANT CHANGE UP (ref 35–40)
POTASSIUM BLDV-SCNC: 3.6 MMOL/L — SIGNIFICANT CHANGE UP (ref 3.4–4.5)
POTASSIUM BLDV-SCNC: 4.2 MMOL/L — SIGNIFICANT CHANGE UP (ref 3.4–4.5)
POTASSIUM SERPL-MCNC: 4.6 MMOL/L — SIGNIFICANT CHANGE UP (ref 3.5–5.3)
POTASSIUM SERPL-SCNC: 4.6 MMOL/L — SIGNIFICANT CHANGE UP (ref 3.5–5.3)
PROT SERPL-MCNC: 8.1 G/DL — SIGNIFICANT CHANGE UP (ref 6–8.3)
RBC # BLD: 4.93 M/UL — SIGNIFICANT CHANGE UP (ref 3.8–5.2)
RBC # FLD: 12.7 % — SIGNIFICANT CHANGE UP (ref 10.3–14.5)
SAO2 % BLDV: 50 % — LOW (ref 60–85)
SAO2 % BLDV: 67.2 % — SIGNIFICANT CHANGE UP (ref 60–85)
SODIUM SERPL-SCNC: 139 MMOL/L — SIGNIFICANT CHANGE UP (ref 135–145)
TROPONIN T, HIGH SENSITIVITY: < 6 NG/L — SIGNIFICANT CHANGE UP (ref ?–14)
TROPONIN T, HIGH SENSITIVITY: < 6 NG/L — SIGNIFICANT CHANGE UP (ref ?–14)
WBC # BLD: 7.14 K/UL — SIGNIFICANT CHANGE UP (ref 3.8–10.5)
WBC # FLD AUTO: 7.14 K/UL — SIGNIFICANT CHANGE UP (ref 3.8–10.5)

## 2019-03-30 PROCEDURE — 70548 MR ANGIOGRAPHY NECK W/DYE: CPT | Mod: 26

## 2019-03-30 PROCEDURE — 71046 X-RAY EXAM CHEST 2 VIEWS: CPT | Mod: 26

## 2019-03-30 PROCEDURE — 70544 MR ANGIOGRAPHY HEAD W/O DYE: CPT | Mod: 26,59

## 2019-03-30 PROCEDURE — 99218: CPT | Mod: 25

## 2019-03-30 PROCEDURE — 70551 MRI BRAIN STEM W/O DYE: CPT | Mod: 26

## 2019-03-30 PROCEDURE — 70450 CT HEAD/BRAIN W/O DYE: CPT | Mod: 26

## 2019-03-30 PROCEDURE — 93010 ELECTROCARDIOGRAM REPORT: CPT | Mod: 59

## 2019-03-30 RX ORDER — AMLODIPINE BESYLATE 2.5 MG/1
5 TABLET ORAL DAILY
Qty: 0 | Refills: 0 | Status: DISCONTINUED | OUTPATIENT
Start: 2019-03-31 | End: 2019-04-03

## 2019-03-30 RX ORDER — SODIUM CHLORIDE 9 MG/ML
1000 INJECTION INTRAMUSCULAR; INTRAVENOUS; SUBCUTANEOUS ONCE
Qty: 0 | Refills: 0 | Status: COMPLETED | OUTPATIENT
Start: 2019-03-30 | End: 2019-03-30

## 2019-03-30 RX ORDER — MECLIZINE HCL 12.5 MG
25 TABLET ORAL ONCE
Qty: 0 | Refills: 0 | Status: COMPLETED | OUTPATIENT
Start: 2019-03-30 | End: 2019-03-30

## 2019-03-30 RX ORDER — AMLODIPINE BESYLATE 2.5 MG/1
5 TABLET ORAL ONCE
Qty: 0 | Refills: 0 | Status: COMPLETED | OUTPATIENT
Start: 2019-03-30 | End: 2019-03-30

## 2019-03-30 RX ADMIN — Medication 25 MILLIGRAM(S): at 09:46

## 2019-03-30 RX ADMIN — SODIUM CHLORIDE 3000 MILLILITER(S): 9 INJECTION INTRAMUSCULAR; INTRAVENOUS; SUBCUTANEOUS at 09:56

## 2019-03-30 RX ADMIN — AMLODIPINE BESYLATE 5 MILLIGRAM(S): 2.5 TABLET ORAL at 09:46

## 2019-03-30 NOTE — ED ADULT NURSE NOTE - NSIMPLEMENTINTERV_GEN_ALL_ED
Implemented All Fall Risk Interventions:  [place] to call system. Call bell, personal items and telephone within reach. Instruct patient to call for assistance. Room bathroom lighting operational. Non-slip footwear when patient is off stretcher. Physically safe environment: no spills, clutter or unnecessary equipment. Stretcher in lowest position, wheels locked, appropriate side rails in place. Provide visual cue, wrist band, yellow gown, etc. Monitor gait and stability. Monitor for mental status changes and reorient to person, place, and time. Review medications for side effects contributing to fall risk. Reinforce activity limits and safety measures with patient and family.

## 2019-03-30 NOTE — ED PROVIDER NOTE - OBJECTIVE STATEMENT
48 y/o F with PMHx of HTN presents to ED with acute onset of dizziness since today. Pt states the dizziness woke her up at 1 am. The dizziness is not described as room spinning, more as lightheadedness. Pt endorses exertional dyspnea for the past several weeks. Pt denies headache, difficulty ambulating, vison changes, associated chest pain, weakness, n/v/d, lower extremity edema or pain, fever, chills or any other medical problems. 48 y/o F with PMHx of HTN presents to ED with acute onset of dizziness since today. Pt states the dizziness woke her up at 1 am. The dizziness is not described as room spinning, more as lightheadedness. Pt endorses exertional dyspnea for the past several weeks. Pt denies headache, difficulty ambulating, vision changes, associated chest pain, weakness, n/v/d, lower extremity edema or pain, fever, chills or any other medical problems.

## 2019-03-30 NOTE — ED CDU PROVIDER INITIAL DAY NOTE - OBJECTIVE STATEMENT
50 y/o F with PMHx of HTN presents to ED with acute onset of dizziness since today. Pt states the dizziness woke her up at 1 am. The dizziness is not described as room spinning, more as lightheadedness. Pt endorses exertional dyspnea for the past several weeks. Pt denies headache, difficulty ambulating, vision changes, associated chest pain, weakness, n/v/d, lower extremity edema or pain, fever, chills or any other medical problems.

## 2019-03-30 NOTE — ED CDU PROVIDER INITIAL DAY NOTE - MEDICAL DECISION MAKING DETAILS
50 yo F here for dizziness and cp. TWI on ekg new from prior visit last week. PLAN: tele, stress, reassess

## 2019-03-30 NOTE — ED PROVIDER NOTE - CLINICAL SUMMARY MEDICAL DECISION MAKING FREE TEXT BOX
50 y/o F with dizziness and no EKG changes. Plan: obtain CBC, CMP, troponin chest X-ray, treat symptomatically and reassess. 48 y/o F with dizziness and new EKG changes. Plan: obtain CBC, CMP, troponin chest X-ray, treat symptomatically and reassess.

## 2019-03-30 NOTE — ED PROVIDER NOTE - CHPI ED SYMPTOMS NEG
headache, difficulty ambulating, vison changes, associated chest pain, weakness, n/v/d, lower extremity edema or pain, fever, chills

## 2019-03-30 NOTE — ED CDU PROVIDER INITIAL DAY NOTE - ATTENDING CONTRIBUTION TO CARE
49 year old female was on the phone with her sister in law at 1AM when she felt like she was spinning it was worse when she stood up to walk and go to the bathroom. It worsens when she moves, improves with remaining still. No paralysis, no weakness, no changes in vision, no slurred speech. On exam, CN II-XII grossly intact, 5/5 strength in all 4 ext, normal sensation, no drift, normal finger to nose, no carotid bruit, rrr, abd soft, nt, lungs cta. Imaging and ct noted. Pt moved to observation for further treatment/testing, MRI/MRA, neuro consult and work up for ekg changes.

## 2019-03-30 NOTE — ED PROVIDER NOTE - NEUROLOGICAL, MLM
Alert and oriented, no focal deficits, no motor or sensory deficits. Cranial nerves 2-12 intact, no dysmetria, no dysdiadochokinesia, no ataxia Alert and oriented, no focal deficits, no motor or sensory deficits. Cranial nerves 2-12 intact, no dysmetria, no dysdiadochokinesia, no ataxia, negative romberg, no pronator drift

## 2019-03-30 NOTE — ED ADULT NURSE NOTE - OBJECTIVE STATEMENT
Pt c/o dizziness, able to ambulate to the bathroom, PMH of HTN. generalized weakness. Medicated with meclizine and amlodipine,

## 2019-03-30 NOTE — ED PROVIDER NOTE - PROGRESS NOTE DETAILS
Mando: Pt still c/o dizziness but ambulating without difficulty low suspicion for VBI since history and exam not consistent but will obtain CT head.

## 2019-03-30 NOTE — ED ADULT NURSE NOTE - CHIEF COMPLAINT QUOTE
Pt states she woke up at 1am and felt dizzy, described like room was spinning. Pt states she waited for it to go away, got up, walked around, then dizziness came back. Reports "room spinning" on and off since then, along with mild headache. Denies chest pain, denies SOB, denies any weakness. Denies history of vertigo.

## 2019-03-31 VITALS
RESPIRATION RATE: 16 BRPM | DIASTOLIC BLOOD PRESSURE: 68 MMHG | TEMPERATURE: 98 F | HEART RATE: 71 BPM | OXYGEN SATURATION: 99 % | SYSTOLIC BLOOD PRESSURE: 129 MMHG

## 2019-03-31 PROCEDURE — 99217: CPT

## 2019-03-31 RX ORDER — MECLIZINE HCL 12.5 MG
1 TABLET ORAL
Qty: 15 | Refills: 0 | OUTPATIENT
Start: 2019-03-31 | End: 2019-04-04

## 2019-03-31 RX ADMIN — AMLODIPINE BESYLATE 5 MILLIGRAM(S): 2.5 TABLET ORAL at 07:12

## 2019-03-31 NOTE — CONSULT NOTE ADULT - SUBJECTIVE AND OBJECTIVE BOX
CE [id number] is a 49y old  Female who presents with a chief complaint of     HPI: 50 yo F with PMH of HTN, HLD, DM presents to the ER with acute onset dizziness. She reports that she was on the phone yesterday morning, and she developed an acute onset of vertigo. Endorses room spinning sensation. She reports that she has had similar symptoms in the past, but not for the past 10 years. Symptoms are worse with position change, so when she has symptoms she lies flat and still in bed. Endorses a slight headache. Denies diplopia, blurry vision, dysphagia, weakness, numbness/tingling.  NIHSS 0. MRS 0.    MEDICATIONS  (STANDING):  amLODIPine   Tablet 5 milliGRAM(s) Oral daily    MEDICATIONS  (PRN):    PAST MEDICAL & SURGICAL HISTORY:  Hyperlipidemia, unspecified hyperlipidemia type  Essential hypertension  Cyst of right ovary: Right ovarian cyst removal , no complications    FAMILY HISTORY:  Family history of valvular heart disease (Aunt, Uncle): Three materal Aunts and Uncles all recieved open heart surgery for valve heart replacements  Family history of myocardial infarction at age less than 60 (Aunt): Maternal Aunt  from MI age 29  Family history of hypertension (Father, Mother): Diagnosed in 60s    Allergies    Aspirin Adult Low Strength (Anaphylaxis)    Intolerances        SHx - No smoking, No ETOH, No drug abuse      Review of Systems:    see hpi      Vital Signs Last 24 Hrs  T(C): 36.7 (30 Mar 2019 22:19), Max: 36.7 (30 Mar 2019 18:41)  T(F): 98.1 (30 Mar 2019 22:19), Max: 98.1 (30 Mar 2019 18:41)  HR: 64 (31 Mar 2019 02:25) (57 - 68)  BP: 119/69 (30 Mar 2019 22:19) (119/69 - 152/100)  BP(mean): --  RR: 15 (30 Mar 2019 22:19) (15 - 18)  SpO2: 100% (30 Mar 2019 22:19) (100% - 100%)         Neurological Exam:  Mental Status: Attention intact.  No dysarthria. Speech fluent.  Cranial Nerves: EOMI, VFF, no nystagmus.   No facial asymmetry. Tongue midline.  Sternocleidomastoid and Trapezius intact bilaterally.         Strength:     [] Upper extremity                      Delt       Bicep    Tricep                                                  R         //5        5/5       5/5                                               L          5/5        5/5        5/5       5/5    [] Lower extremity                       HF          KE          KF        DF         PF                                               R        /        5/5        5/5       5/5       5/5                                               L         /5        5/5       5/5       5/5        5/5  Pronator drift: none                 Dysmetria: None to finger-nose-finger  No truncal ataxia.    Tremor: No resting, postural or action tremor.  No myoclonus.    Sensation: intact to light touch    [place] Hallpike: no nystagmus elicited, but dizziness was produced with head turned to the right in [place]-Hallpike manuever     Other:        139  |  102  |  10  ----------------------------<  115<H>  4.6   |  22  |  0.61    Ca    10.1      30 Mar 2019 09:50    TPro  8.1  /  Alb  4.9  /  TBili  0.3  /  DBili  x   /  AST  28  /  ALT  23  /  AlkPhos  63                              14.4   7.14  )-----------( 333      ( 30 Mar 2019 09:50 )             43.4       Radiology    CT: < from: CT Head No Cont (19 @ 11:59) >  IMPRESSION:  No intracranial hemorrhage, mass effect, or midline shift.  Streak artifact limits evaluation of the posterior fossa.    < end of copied text >    MRI  EKG:  tele:  TTE:  EEG:

## 2019-03-31 NOTE — CONSULT NOTE ADULT - ASSESSMENT
Impression: Given positional nature of symptoms, Mag-Hallpike showing worsening of symptoms with head turned to right, this is most likely peripheral vertigo.    Plan:  -await final report of MRI brain w/o contrast  -outpatient neurology/ENT followup

## 2019-03-31 NOTE — ED CDU PROVIDER DISPOSITION NOTE - CLINICAL COURSE
Nicholas: Admitted to CDU for vertigo. Improved with meclizine. Seen by Neuro. MRI neg. On day of dc, less vertigo. Describes 3 yrs GONCALVES going up stairs. Stress test in 1/19 neg. Will not repeat stress test so soon after negative one. Dc home.

## 2019-03-31 NOTE — ED CDU PROVIDER DISPOSITION NOTE - NSFOLLOWUPINSTRUCTIONS_ED_ALL_ED_FT
Drink plenty of fluids - stay hydrated. Take Meclizine 25 mg every 8 hours as needed for dizziness. Please continue all home medications as directed. See your regular doctor within 72 hours for follow-up care. Return to ER for new or worsening symptoms. Drink plenty of fluids - stay hydrated. Take Meclizine 25 mg every 8 hours as needed for dizziness. Please continue all home medications as directed. See your regular doctor/neurology (information above) within 72 hours for follow-up care. Return to ER for new or worsening symptoms.

## 2019-03-31 NOTE — ED CDU PROVIDER DISPOSITION NOTE - CARE PROVIDER_API CALL
Schoenberg, Laura G (DO)  Neurology  [address] [place] [address]  Phone: [phone]  Fax: [phone]  Follow Up Time:

## 2019-03-31 NOTE — ED CDU PROVIDER SUBSEQUENT DAY NOTE - PHYSICAL EXAMINATION
CN II-XII normal.  5/5 UE and LE strength.  Rapid alternating movements intact.  Negative Rhomberg.  Negative Pronator drift.  Ambulatory without assistance.  Sensation intact to light touch.

## 2019-03-31 NOTE — ED CDU PROVIDER SUBSEQUENT DAY NOTE - PROGRESS NOTE DETAILS
Pt NAD, VSS, no acute complaints. No events on tele. PE: cardiac: RRR, Lungs CTA, abdomen: soft, nontender, nondistended. Pending MRI resutls. Will continue to monitor. Left a message with cardiologist Dr. Clyde Milligan - [phone] - awaiting call back. According the the pt she had a normal stress test 3 months ago. MRI/MRA head/neck WNL. Cleared by neurology attending - recommends to f/u out pt with Dr. Schoenberg. No events on tele. Spoke with the stress tech - will not stress the patient with previous nuclear stress done ~3 months ago. Discussed with FIT attending - shared decision making with the patient - She will f.u out patient with her cardiologist for her cardiac evaluation. Tropx2 neg here - no complaints of CP. She endorses 3 years of SOB which is unchanged. Spoke with the patients Cardiologist Dr. Milligan - I have discussed the case with physician - he will establish follow up with the patient out pt. He does not think it is necessary to keep the patient for additional ACS r/o. Pt ok for dc.

## 2019-03-31 NOTE — ED CDU PROVIDER DISPOSITION NOTE - ATTENDING CONTRIBUTION TO CARE
I performed a face-to-face evaluation of the patient and performed a history and physical examination. I agree with the history and physical examination.    Nicholas: Admitted to CDU for vertigo. Improved with meclizine. Seen by Neuro. MRI neg. On day of dc, less vertigo. Describes 3 yrs GONCALVES going up stairs. Stress test in 1/19 neg. Will not repeat stress test so soon after negative one. Dc home.

## 2019-03-31 NOTE — ED CDU PROVIDER SUBSEQUENT DAY NOTE - HISTORY
50yo F with PMHx of HTN and HLD presented to the ED c/o dizziness, roomspinning/ lightheadedness x 1 day and GONCALVES x weeks. pt labs wnl, trops,x 2 <6, ekg compared to previous showing TWI in I and avL, Ct head negative. Pt seen and evaluated by neuro, likely dx is peripheral vertigo. Pt pending MRI result.   pt c/o GONCALVES and ekg changes patient in for stress test in am. However after further talking to the patient, she had her last stress test with her cardiologist, Dr. rebecca bacon in Jan (3 months ago). Stress test still pending this am, attempt to reach cardiologist at [phone], left message with call back numbness.    Denies current complaints for cp, sob, dizziness.

## 2019-03-31 NOTE — CONSULT NOTE ADULT - ATTENDING COMMENTS
Patient seen and examined agree with above.  Most likely peripheral vertigo given positive mojica pike maneuver     MRI brain

## 2019-03-31 NOTE — ED CDU PROVIDER SUBSEQUENT DAY NOTE - MEDICAL DECISION MAKING DETAILS
50yo F with PMHx of HTN and HLD presented to the ED c/o dizziness, roomspinning/ lightheadedness x 1 day and GONCALVES x weeks. pt labs wnl, trops,x 2 <6, ekg compared to previous showing TWI in I and avL, Ct head negative. Pt seen and evaluated by neuro, likely dx is peripheral vertigo. Pt pending MRI result.   pt c/o GONCALVES and ekg changes patient in for stress test in am. However after further talking to the patient, she had her last stress test with her cardiologist, Dr. rebecca bacon in Jan (3 months ago). Stress test still pending this am, attempt to reach cardiologist at [phone], left message with call back numbness.

## 2019-03-31 NOTE — ED CDU PROVIDER SUBSEQUENT DAY NOTE - CONSTITUTIONAL, MLM
Size Of Margin In Cm: 0.3 normal... Well appearing, well nourished, awake, alert, oriented to person, place, time/situation and in no apparent distress.

## 2019-09-27 NOTE — ED ADULT TRIAGE NOTE - BANDS:
Patient:   MIGNON HAIDER            MRN: [id number]            FIN: [id number]               Age:   49 years     Sex:  FEMALE     :  64   Associated Diagnoses:   None   Author:   PATRICK CHAMBERS      Chief Complaint   reflux, screen      History of Present Illness             The patient presents with.     reflux, screen      Histories   Past Med History: Past Medical History   Acid reflux  High cholesterol  Migraine  Osteoarthritis     Family History:       Procedure History:    disc removed.  c section x 3.  tubal ligation.   Social History        Alcohol  Details: Use: Current.  Tobacco  Details: Smoked/Smokeless Tobacco Last 30 Days: No.  Smoking Tobacco Use: Never smoker.  Smokeless Tobacco Use Never.  .        Health Status   Allergies:    Allergic Reactions (Selected)  Severity Not Documented  N,- No reactions were documented.  NKA- No reactions were documented.   Current medications:  (Selected)   Documented Medications  Documented  Probiotic Formula: 1 cap, Oral, Daily  Vitamin D3 2000 intl units oral capsule: 2,000 unit, 1 cap, Oral, Daily  Vitamin D3 2000 intl units oral capsule: 2,000 unit, 1 cap, Oral, Daily  diclofenac: 75 mg, Oral, BID, prn  eletriptan 40 mg oral tablet: 40 mg, 1 tab, Oral, Daily, may repeat dose once in 2 hours, PRN: for migraine headache, 6 tab  magnesium oxide 400 mg (240 mg elemental magnesium) oral tablet: 400 mg, 1 tab, Oral, Daily  omeprazole: 20 mg, Oral, Daily      Physical Examination   General:  Alert and oriented, No acute distress.    Respiratory:  Lungs are clear to auscultation.    Cardiovascular:  Regular rhythm, No murmur.    Gastrointestinal:  Soft, Non-tender, Non-distended.    Neurologic:  No focal deficits.       Impression and Plan   reflux  screen    plan  egd / colon            Electronically Signed On 12/15/2017 11:39  __________________________________________________   PATRICK CHAMBERS    
Allergy;

## 2025-03-27 NOTE — ED SUB INTERN NOTE - ENMT, MLM
Lab Results   Component Value Date    HGBA1C 9.9 (H) 03/26/2025       Recent Labs     03/26/25  1610 03/26/25  2216 03/27/25  0717   POCGLU 206* 132 98       Blood Sugar Average: Last 72 hrs:  (P) [id number]    Hold prehospital oral hypoglycemics while inpatient, resume on discharge  Home regimen includes 5 mg Tradjenta daily, 500 mg metformin twice a day, 2 mg Prandin twice a day  Target blood sugar inpatient 140-180  Blood sugars are optimized  Continue SSI ACHS  Continue diabetic low-salt diet  Hypoglycemic protocol  BMP a.m.   Airway patent.
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
